# Patient Record
Sex: FEMALE | Race: WHITE
[De-identification: names, ages, dates, MRNs, and addresses within clinical notes are randomized per-mention and may not be internally consistent; named-entity substitution may affect disease eponyms.]

---

## 2018-12-27 ENCOUNTER — HOSPITAL ENCOUNTER (EMERGENCY)
Dept: HOSPITAL 80 - FED | Age: 73
Discharge: HOME | End: 2018-12-27
Payer: COMMERCIAL

## 2018-12-27 VITALS — SYSTOLIC BLOOD PRESSURE: 145 MMHG | DIASTOLIC BLOOD PRESSURE: 75 MMHG

## 2018-12-27 DIAGNOSIS — H65.192: Primary | ICD-10-CM

## 2018-12-27 DIAGNOSIS — L03.211: ICD-10-CM

## 2018-12-27 NOTE — EDPHY
H & P


Time Seen by Provider: 12/27/18 16:13


HPI/ROS: 





CHIEF COMPLAINT:  Left ear pain, left periorbital erythema





HISTORY OF PRESENT ILLNESS:  73-year-old immunocompetent female with up-to-date 

tetanus [place] complaining of congestion, pressure in her 

ear.  She was seen at urgent care informed she had an ear fusion recommended 

decongestant.  Her symptoms continue or worsen other she stops Sudafed.  No 

acute symptoms





Patient also notes for the past 3 days new erythema in her eyebrow region.  She 

plucks and shaves her eyebrows.  No ocular pain.








************


PHYSICAL EXAM





(Prior to examination, patient consented to physical exam, hands were washed 

and my usual and customary physical exam procedures followed)


1) GENERAL: Well-developed, well-nourished, alert and oriented.  Appears to be 

in no acute distress.


2) HEAD: Normocephalic


3) HEENT: sclera anicteric .  No injection.  No proptosis.  No pain with 

extraocular movements.  No crepitus.  Erythema in the left eyebrow region with 

the patient shaves her eyebrow hair consistent with cellulitis.  No facial 

lesions no zoster negative Driscoll sign.  Left ear has no evidence of 

infection common does have a noted middle ear effusion with no otitis media 

otitis externa. 


4) LUNGS: Breathing comfortably.   


5) SKIN:  Intact no lesions   


[


Smoking Status: Never smoked


Constitutional: 





 Initial Vital Signs











Temperature (C)  36.4 C   12/27/18 15:42


 


Heart Rate  80   12/27/18 15:42


 


Respiratory Rate  16   12/27/18 15:42


 


Blood Pressure  145/75 H  12/27/18 15:42


 


O2 Sat (%)  97   12/27/18 15:42








 











O2 Delivery Mode               Room Air














Allergies/Adverse Reactions: 


 





No Known Allergies Allergy (Unverified 12/27/18 15:41)


 








Home Medications: 














 Medication  Instructions  Recorded


 


Allegra Allergy  12/27/18


 


Benadryl  12/27/18


 


Cephalexin [Keflex] 500 mg PO TID 7 Days  cap 12/27/18


 


Pseudoephedrine ER  12/27/18














MDM/Departure





- MDM


ED Course/Re-evaluation: 





Patient's symptoms are consistent with cellulitis in her eyebrow region where 

she shaves and plucks her eyebrow hair.  Doubt orbital cellulitis.  Doubt 

facial zoster.  Her ear has no evidence of otitis media otitis externa.  

Recommend continue decongestant.  I am starting the patient on oral Keflex.  

Her tetanus is up-to-date.  She is returning home to [place] in a few days.  

Given usual customary wound precautions and instructions.  Care of patient 

under supervision of  secondary supervising physician Dr Velasquez .





- Depart


Disposition: Home, Routine, Self-Care


Clinical Impression: 


 Facial cellulitis, Acute effusion of left ear





Condition: Good


Instructions:  Cellulitis (ED)


Additional Instructions: 


Return to the ER if you develop pain with eye movements, fever, skin lesions or 

any other symptoms that concern you.


Prescriptions: 


Cephalexin [Keflex] 500 mg PO TID 7 Days  cap


Referrals: 


Lucretia Bustillo MD [Medical Doctor] - 2-3 days without fail (You may also 

follow up with your regular doctor at home)